# Patient Record
Sex: MALE | Race: ASIAN | NOT HISPANIC OR LATINO | Employment: FULL TIME | URBAN - METROPOLITAN AREA
[De-identification: names, ages, dates, MRNs, and addresses within clinical notes are randomized per-mention and may not be internally consistent; named-entity substitution may affect disease eponyms.]

---

## 2021-05-29 ENCOUNTER — RECORDS - HEALTHEAST (OUTPATIENT)
Dept: ADMINISTRATIVE | Facility: CLINIC | Age: 36
End: 2021-05-29

## 2023-11-15 ENCOUNTER — OFFICE VISIT (OUTPATIENT)
Dept: FAMILY MEDICINE | Facility: CLINIC | Age: 38
End: 2023-11-15
Payer: COMMERCIAL

## 2023-11-15 VITALS
HEART RATE: 91 BPM | SYSTOLIC BLOOD PRESSURE: 138 MMHG | BODY MASS INDEX: 28.93 KG/M2 | RESPIRATION RATE: 18 BRPM | HEIGHT: 66 IN | WEIGHT: 180 LBS | OXYGEN SATURATION: 97 % | TEMPERATURE: 98.5 F | DIASTOLIC BLOOD PRESSURE: 72 MMHG

## 2023-11-15 DIAGNOSIS — H10.023 PINK EYE DISEASE OF BOTH EYES: Primary | ICD-10-CM

## 2023-11-15 DIAGNOSIS — J02.9 SORE THROAT: ICD-10-CM

## 2023-11-15 DIAGNOSIS — R21 RASH: ICD-10-CM

## 2023-11-15 LAB
DEPRECATED S PYO AG THROAT QL EIA: NEGATIVE
GROUP A STREP BY PCR: NOT DETECTED

## 2023-11-15 PROCEDURE — 99203 OFFICE O/P NEW LOW 30 MIN: CPT | Performed by: FAMILY MEDICINE

## 2023-11-15 PROCEDURE — 87651 STREP A DNA AMP PROBE: CPT | Performed by: FAMILY MEDICINE

## 2023-11-15 RX ORDER — POLYMYXIN B SULFATE AND TRIMETHOPRIM 1; 10000 MG/ML; [USP'U]/ML
1-2 SOLUTION OPHTHALMIC EVERY 4 HOURS
Qty: 10 ML | Refills: 0 | Status: SHIPPED | OUTPATIENT
Start: 2023-11-15

## 2023-11-15 ASSESSMENT — ENCOUNTER SYMPTOMS: EYE PAIN: 1

## 2023-11-15 NOTE — PATIENT INSTRUCTIONS
Take COVID test at home. Call me if it is positive    Use eye drop every 4 hours while awake. Use for 5 days. Ok to stop using if eye clears up before 5 days    I will call you tomorrow with Strep throat test result    Ok to continue ibuprofen and Mucinex

## 2023-11-15 NOTE — PROGRESS NOTES
"  {PROVIDER CHARTING PREFERENCE:317546}    Haley Elliott is a 38 year old, presenting for the following health issues:    Establish Care and Eye Problem (Possible eye infection of some kind since Sat-started in L eye now also  in R eye but not  as bad. Been having a sore throat as well recently )      11/15/2023     1:07 PM   Additional Questions   Roomed by Monika       Eye Problem     History of Present Illness       Reason for visit:  Eyes might have infection  Symptom onset:  3-7 days ago  Symptoms include:  Red eyes discharge from eyes headache sore throat  Symptom intensity:  Mild  Symptom progression:  Staying the same  Had these symptoms before:  Yes  Has tried/received treatment for these symptoms:  No  What makes it worse:  Night time  What makes it better:  No    He eats 2-3 servings of fruits and vegetables daily.He consumes 1 sweetened beverage(s) daily.He exercises with enough effort to increase his heart rate 10 to 19 minutes per day.  He exercises with enough effort to increase his heart rate 4 days per week.   He is taking medications regularly.       {MA/LPN/RN Pre-Provider Visit Orders- hCG/UA/Strep (Optional):994580}  {SUPERLIST (Optional):992465}  {additonal problems for provider to add (Optional):006162}      Review of Systems   Eyes:  Positive for pain.      {ROS COMP (Optional):719681}      Objective    /72 (BP Location: Left arm, Patient Position: Sitting, Cuff Size: Adult Regular)   Pulse 91   Temp 98.5  F (36.9  C) (Temporal)   Resp 18   Ht 1.683 m (5' 6.25\")   Wt 81.6 kg (180 lb)   SpO2 97%   BMI 28.83 kg/m    Body mass index is 28.83 kg/m .  Physical Exam   {Exam List (Optional):536536}    {Diagnostic Test Results (Optional):862571}    {AMBULATORY ATTESTATION (Optional):360457}              Answers submitted by the patient for this visit:  General Questionnaire (Submitted on 11/15/2023)  Chief Complaint: Chronic problems general questions HPI Form  How many servings of " fruits and vegetables do you eat daily?: 2-3  On average, how many sweetened beverages do you drink each day (Examples: soda, juice, sweet tea, etc.  Do NOT count diet or artificially sweetened beverages)?: 1  How many minutes a day do you exercise enough to make your heart beat faster?: 10 to 19  How many days a week do you exercise enough to make your heart beat faster?: 4  How many days per week do you miss taking your medication?: 0  General Concern (Submitted on 11/15/2023)  Chief Complaint: Chronic problems general questions HPI Form  What is the reason for your visit today?: eyes might have infection  When did your symptoms begin?: 3-7 days ago  What are your symptoms?: red eyes discharge from eyes headache sore throat  How would you describe these symptoms?: Mild  Are your symptoms:: Staying the same  Have you had these symptoms before?: Yes  Have you tried or received treatment for these symptoms before?: No  Is there anything that makes you feel worse?: night time  Is there anything that makes you feel better?: no

## 2023-11-15 NOTE — PROGRESS NOTES
"Assessment/Plan.    Conjunctivitis, b/l. Given other URI symptoms, etiology is most likely viral.  -Elliott to take rapid COVID test at home  -given severity of sore throat, will check for Strep  -start Polytrim drops  -stay home from work until symptoms start to improve. Elliott declines letter for employer    Lesion near left ear.  Suspect seborrheic dermatitis.  Could be fungal.  -Start consistent emollient use.  If refractory, consider trial of antifungal or steroid    ----  Chief complaint: Establish Care and Eye Problem (Possible eye infection of some kind since Sat-started in L eye now also  in R eye but not  as bad. Been having a sore throat as well recently )    Social History     Social History Narrative    -Grew up in [place]. Moved to [place] around 2013    -Lives with wife and 4 kids (born around [id number])    -Works as  at [employer]        Updated 11/15/2023     Daughter eye infection as well    Eye redness.  Started on Saturday, 4 days ago.  Mild pain and itching.  Foreign body sensation.  Discharge, mattering.    No photosensitivity.  No severe eye pain.  No vision changes.    Now with similar symptoms of right eye.    Denies recent eye trauma.  Wears glasses, but not contacts.  Daughter recently had an eye infection; symptoms resolved.    Tried over-the-counter redness and pink eye drops.      Also notes headache and sore throat.  Sore throat started yesterday.  Chills, no fever.  No cough.  Tried Mucinex and ibuprofen.    History of seasonal allergies.  Patient reports that current symptoms do not seem consistent with past episodes of allergies.    Exam  /72 (BP Location: Left arm, Patient Position: Sitting, Cuff Size: Adult Regular)   Pulse 91   Temp 98.5  F (36.9  C) (Temporal)   Resp 18   Ht 1.683 m (5' 6.25\")   Wt 81.6 kg (180 lb)   SpO2 97%   BMI 28.83 kg/m      Conjunctival injection bilaterally, more pronounced on the left.  Mattering of left eye.  Extraocular " movements intact.    Left nasal passage inflamed.  Oropharynx without abnormal masses.  Left cervical adenopathy.  Lung fields clear to auscultation bilaterally.    Mildly raised, somewhat rough patch posterior to left ear.  Small area of superficial ulceration within the patch.